# Patient Record
Sex: MALE | Race: BLACK OR AFRICAN AMERICAN | NOT HISPANIC OR LATINO | URBAN - METROPOLITAN AREA
[De-identification: names, ages, dates, MRNs, and addresses within clinical notes are randomized per-mention and may not be internally consistent; named-entity substitution may affect disease eponyms.]

---
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
## 2020-08-06 ENCOUNTER — HISTORICAL (OUTPATIENT)
Dept: ADMINISTRATIVE | Facility: HOSPITAL | Age: 20
End: 2020-08-06

## 2020-08-06 LAB
ABS NEUT (OLG): 2.08 X10(3)/MCL (ref 2.1–9.2)
ALBUMIN SERPL-MCNC: 3.7 GM/DL (ref 3.4–5)
ALBUMIN/GLOB SERPL: 1 RATIO (ref 1.1–2)
ALP SERPL-CCNC: 81 UNIT/L (ref 45–117)
ALT SERPL-CCNC: 36 UNIT/L (ref 12–78)
AST SERPL-CCNC: 25 UNIT/L (ref 15–37)
BASOPHILS NFR BLD MANUAL: 0 %
BILIRUB SERPL-MCNC: 0.3 MG/DL (ref 0.2–1)
BILIRUBIN DIRECT+TOT PNL SERPL-MCNC: 0.1 MG/DL (ref 0–0.2)
BILIRUBIN DIRECT+TOT PNL SERPL-MCNC: 0.2 MG/DL
BUN SERPL-MCNC: 8 MG/DL (ref 7–18)
CALCIUM SERPL-MCNC: 8.9 MG/DL (ref 8.5–10.1)
CHLORIDE SERPL-SCNC: 109 MMOL/L (ref 98–107)
CO2 SERPL-SCNC: 27 MMOL/L (ref 21–32)
CREAT SERPL-MCNC: 1 MG/DL (ref 0.6–1.3)
EOSINOPHIL NFR BLD MANUAL: 7 %
ERYTHROCYTE [DISTWIDTH] IN BLOOD BY AUTOMATED COUNT: 12.5 % (ref 11.5–14.5)
GLOBULIN SER-MCNC: 3.6 GM/ML (ref 2.3–3.5)
GLUCOSE SERPL-MCNC: 96 MG/DL (ref 74–106)
GRANULOCYTES NFR BLD MANUAL: 31 % (ref 43–75)
HCT VFR BLD AUTO: 44.2 % (ref 40–51)
HGB BLD-MCNC: 14.7 GM/DL (ref 13.5–17.5)
LYMPHOCYTES NFR BLD MANUAL: 56 % (ref 20.5–51.1)
MCH RBC QN AUTO: 32.2 PG (ref 26–34)
MCHC RBC AUTO-ENTMCNC: 33.3 GM/DL (ref 31–37)
MCV RBC AUTO: 96.7 FL (ref 80–100)
MONOCYTES NFR BLD MANUAL: 6 % (ref 2–9)
PLATELET # BLD AUTO: 175 X10(3)/MCL (ref 130–400)
PLATELET # BLD EST: ADEQUATE 10*3/UL
PMV BLD AUTO: 12 FL (ref 7.4–10.4)
POTASSIUM SERPL-SCNC: 4 MMOL/L (ref 3.5–5.1)
PROT SERPL-MCNC: 7.3 GM/DL (ref 6.4–8.2)
RBC # BLD AUTO: 4.57 X10(6)/MCL (ref 4.5–5.9)
RBC MORPH BLD: NORMAL
SODIUM SERPL-SCNC: 140 MMOL/L (ref 136–145)
WBC # SPEC AUTO: 6.1 X10(3)/MCL (ref 4.5–11)
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
## 2023-06-25 ENCOUNTER — OFFICE VISIT (OUTPATIENT)
Dept: URGENT CARE | Facility: CLINIC | Age: 23
End: 2023-06-25
Payer: MEDICAID

## 2023-06-25 VITALS
HEART RATE: 63 BPM | DIASTOLIC BLOOD PRESSURE: 82 MMHG | BODY MASS INDEX: 29.25 KG/M2 | SYSTOLIC BLOOD PRESSURE: 130 MMHG | TEMPERATURE: 98 F | OXYGEN SATURATION: 98 % | RESPIRATION RATE: 18 BRPM | HEIGHT: 68 IN | WEIGHT: 193 LBS
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
## 2023-06-25 DIAGNOSIS — S91.332A PUNCTURE WOUND OF LEFT FOOT, INITIAL ENCOUNTER: ICD-10-CM

## 2023-06-25 PROCEDURE — 90715 TDAP VACCINE 7 YRS/> IM: CPT | Mod: PBBFAC

## 2023-06-25 PROCEDURE — 99213 PR OFFICE/OUTPT VISIT, EST, LEVL III, 20-29 MIN: ICD-10-PCS | Mod: S$PBB,,, | Performed by: NURSE PRACTITIONER

## 2023-06-25 PROCEDURE — 99214 OFFICE O/P EST MOD 30 MIN: CPT | Mod: PBBFAC | Performed by: NURSE PRACTITIONER

## 2023-06-25 PROCEDURE — 99213 OFFICE O/P EST LOW 20 MIN: CPT | Mod: S$PBB,,, | Performed by: NURSE PRACTITIONER

## 2023-06-25 PROCEDURE — 90471 IMMUNIZATION ADMIN: CPT | Mod: PBBFAC

## 2023-06-25 RX ORDER — CEPHALEXIN 500 MG/1
500 CAPSULE ORAL EVERY 6 HOURS
Qty: 40 CAPSULE | Refills: 0 | Status: SHIPPED | OUTPATIENT
Start: 2023-06-25 | End: 2023-07-05

## 2023-06-25 NOTE — PATIENT INSTRUCTIONS
do not put hydrogen peroxide, rubbing alcohol, or any items from the kitchen onto the wound  - take antibiotics as prescribed  - if you get fever, increasing pain, increasing drainage, go to the ER.  - follow up with your PCP next week for a wound check    Apply Warm Compresses to your infection 4-5x/day.  Run the warm to hot water in the sink. Wet a wash rag completely. Ring it out. Fold it in half or in fours. Apply to groin. Leave it there until it reaches room temperature, then throw it in the dirty clothes. Do not use the same rag more than once. Wash them all, including other bath towels, sheets, blankets and clothes in hot water.    Tylenol and Motrin OTC for pain and discomfort.

## 2023-06-25 NOTE — PROGRESS NOTES
"Subjective:      Patient ID: Salo Ocampo is a 23 y.o. male.    Vitals:  height is 5' 8" (1.727 m) and weight is 87.5 kg (193 lb). His oral temperature is 98.3 °F (36.8 °C). His blood pressure is 130/82 and his pulse is 63. His respiration is 18 and oxygen saturation is 98%.     Chief Complaint: Wound Check (L foot injury, Pt states he stepped on nail last night .)    HPI as stated in CC. Pt reports nail was in a old wood board. c/o pain to small puncture wound to bottom of left foot. Last tetanus unknown.     Skin:  Positive for puncture wound. Negative for erythema.        Stepped on a tal nail in a board last night.    Objective:     Physical Exam   Constitutional: He is oriented to person, place, and time. He does not appear ill.   HENT:   Head: Normocephalic and atraumatic.   Nose: Nose normal.   Mouth/Throat: Mucous membranes are moist.   Eyes: Conjunctivae are normal.   Cardiovascular: Normal rate.   Pulmonary/Chest: Effort normal and breath sounds normal. No stridor. No respiratory distress. He has no wheezes. He has no rhonchi. He exhibits no tenderness.   Abdominal: Normal appearance and bowel sounds are normal. He exhibits no distension. Soft. There is no abdominal tenderness.   Musculoskeletal: Normal range of motion.         General: Normal range of motion.   Lymphadenopathy:     He has no cervical adenopathy.   Neurological: no focal deficit. He is alert and oriented to person, place, and time.   Skin: Skin is warm, dry and not diaphoretic. No erythema         Comments: < 1 cm puncture wound to bottom of left foot. No erythema or discharge noted. Mild TTP.    Psychiatric: His behavior is normal. Mood, judgment and thought content normal.   Nursing note and vitals reviewed.    Assessment:     1. Puncture wound of skin from metal nail    2. Puncture wound of left foot, initial encounter        Plan:    Do not put hydrogen peroxide, rubbing alcohol, or any items from the kitchen onto the " wound.  Keep clean and dry with antibacterial soap.  - take antibiotics as prescribed  - if you get fever, increasing pain, increasing drainage, go to the ER.  - follow up with your PCP next week for a wound check    Apply Warm Compresses to your wound 4-5x/day.  Run the warm to hot water in the sink. Wet a wash rag completely. Ring it out. Fold it in half or in fours. Apply to foot. Leave it there until it reaches room temperature, then throw it in the dirty clothes. Do not use the same rag more than once. Wash them all, including other bath towels, sheets, blankets and clothes in hot water.    Tylenol and Motrin OTC for pain and discomfort.    Puncture wound of skin from metal nail    Puncture wound of left foot, initial encounter  -     cephALEXin (KEFLEX) 500 MG capsule; Take 1 capsule (500 mg total) by mouth every 6 (six) hours. for 10 days  Dispense: 40 capsule; Refill: 0    Other orders  -     Tdap Vaccine